# Patient Record
Sex: FEMALE | Race: WHITE
[De-identification: names, ages, dates, MRNs, and addresses within clinical notes are randomized per-mention and may not be internally consistent; named-entity substitution may affect disease eponyms.]

---

## 2018-06-14 ENCOUNTER — HOSPITAL ENCOUNTER (OUTPATIENT)
Dept: HOSPITAL 62 - END | Age: 78
Discharge: HOME | End: 2018-06-14
Attending: INTERNAL MEDICINE
Payer: MEDICARE

## 2018-06-14 VITALS — DIASTOLIC BLOOD PRESSURE: 67 MMHG | SYSTOLIC BLOOD PRESSURE: 126 MMHG

## 2018-06-14 DIAGNOSIS — K21.0: Primary | ICD-10-CM

## 2018-06-14 DIAGNOSIS — I10: ICD-10-CM

## 2018-06-14 DIAGNOSIS — Z79.899: ICD-10-CM

## 2018-06-14 DIAGNOSIS — K29.50: ICD-10-CM

## 2018-06-14 DIAGNOSIS — Z88.5: ICD-10-CM

## 2018-06-14 PROCEDURE — 88305 TISSUE EXAM BY PATHOLOGIST: CPT

## 2018-06-14 PROCEDURE — 43239 EGD BIOPSY SINGLE/MULTIPLE: CPT

## 2018-06-14 RX ADMIN — FENTANYL CITRATE ONE MCG: 50 INJECTION INTRAMUSCULAR; INTRAVENOUS at 13:11

## 2018-06-14 RX ADMIN — FENTANYL CITRATE ONE MCG: 50 INJECTION INTRAMUSCULAR; INTRAVENOUS at 13:15

## 2018-06-14 NOTE — OPERATIVE REPORT
Operative Report


DATE OF SURGERY: 06/14/18


Operative Report: 





The risks benefits and alternatives of the procedure explained to the patient 

in detail and informed consent is obtained.A GIF Olympus video scope was 

inserted into the patient's mouth and hypopharynx, the esophagus is identified 

intubated and insufflated, the scope was then advanced through the esophagus 

stomach and duodenum, retroflexion maneuver is done ,the esophagus stomach and 

first and second portions of the duodenum examined


PREOPERATIVE DIAGNOSIS: Epigastric pain, gastroesophageal reflux disease


POSTOPERATIVE DIAGNOSIS: Mid esophageal lesion that is more consistent with 

Beyer's status post biopsy.  Gastritis status post biopsy rule out 

Helicobacter pylori


OPERATION: EGD with biopsy


SURGEON: GUZMAN EGAN


ANESTHESIA: Moderate Sedation - 2 mg of Versed, 75 mcg of fentanyl.  Conscious 

sedation monitoring time 30 minutes.


TISSUE REMOVED OR ALTERED: As noted above.


COMPLICATIONS: 





None.


ESTIMATED BLOOD LOSS: None.


INTRAOPERATIVE FINDINGS: As noted above.


PROCEDURE: 





Patient tolerated the procedure well.


No immediate postprocedure complications are noted.


Patient discharged in good condition.


Discharge date 6/14/2018.


Discharge diet: Regular.


Discharge activity: Regular.


2-3 week follow-up to discuss findings.


Patient is instructed call the office or proceed to the emergency room should 

there be any further problems or questions.


We will be on the pathology.


If biopsies are positive may need ablation but will speak to the patient before 

that.

## 2018-06-26 ENCOUNTER — HOSPITAL ENCOUNTER (OUTPATIENT)
Dept: HOSPITAL 62 - END | Age: 78
Discharge: HOME | End: 2018-06-26
Attending: INTERNAL MEDICINE
Payer: MEDICARE

## 2018-06-26 VITALS — DIASTOLIC BLOOD PRESSURE: 68 MMHG | SYSTOLIC BLOOD PRESSURE: 117 MMHG

## 2018-06-26 DIAGNOSIS — I10: ICD-10-CM

## 2018-06-26 DIAGNOSIS — K21.9: ICD-10-CM

## 2018-06-26 DIAGNOSIS — Z88.5: ICD-10-CM

## 2018-06-26 DIAGNOSIS — R00.1: ICD-10-CM

## 2018-06-26 DIAGNOSIS — F41.9: ICD-10-CM

## 2018-06-26 DIAGNOSIS — Z79.82: ICD-10-CM

## 2018-06-26 DIAGNOSIS — Z79.899: ICD-10-CM

## 2018-06-26 DIAGNOSIS — Z13.89: ICD-10-CM

## 2018-06-26 DIAGNOSIS — K22.719: Primary | ICD-10-CM

## 2018-06-26 PROCEDURE — 43270 EGD LESION ABLATION: CPT

## 2018-06-26 RX ADMIN — FENTANYL CITRATE ONE MCG: 50 INJECTION INTRAMUSCULAR; INTRAVENOUS at 12:48

## 2018-06-26 RX ADMIN — MIDAZOLAM HYDROCHLORIDE ONE MG: 1 INJECTION, SOLUTION INTRAMUSCULAR; INTRAVENOUS at 12:46

## 2018-06-26 RX ADMIN — MIDAZOLAM HYDROCHLORIDE ONE MG: 1 INJECTION, SOLUTION INTRAMUSCULAR; INTRAVENOUS at 12:42

## 2018-06-26 RX ADMIN — FENTANYL CITRATE ONE MCG: 50 INJECTION INTRAMUSCULAR; INTRAVENOUS at 12:44

## 2018-06-26 NOTE — OPERATIVE REPORT
Operative Report


DATE OF SURGERY: 06/26/18


Operative Report: 





The risks benefits and alternatives of the procedure explained to the patient 

in detail and informed consent is obtained.A GIF Olympus video scope was 

inserted into the patient's mouth and hypopharynx, the esophagus is identified 

intubated and insufflated, the scope was then advanced through the esophagus 

stomach and duodenum, retroflexion maneuver is done ,the esophagus stomach and 

first and second portions of the duodenum examined


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


POSTOPERATIVE DIAGNOSIS: Beyer's esophagus status post ablation


OPERATION: EGD with ablation


SURGEON: GUZMAN EGAN


ANESTHESIA: Moderate Sedation - 4 mg of Versed, 75 mcg of fentanyl.  Conscious 

sedation monitoring time 30 minutes.


TISSUE REMOVED OR ALTERED: None.


COMPLICATIONS: 





None.


ESTIMATED BLOOD LOSS: None.


INTRAOPERATIVE FINDINGS: As noted above.


PROCEDURE: 





Patient tolerated the procedure well.


No immediate postprocedure complications are noted.


Patient discharged in good condition.


Discharge date 6/26/2018.


Discharge diet: Regular.


Discharge activity: Regular.


2-3 week follow-up to discuss findings.


Patient is instructed to call the office or proceed to the emergency room 

should there be any further problems or questions.

## 2019-08-28 ENCOUNTER — HOSPITAL ENCOUNTER (OUTPATIENT)
Dept: HOSPITAL 62 - END | Age: 79
Discharge: HOME | End: 2019-08-28
Attending: INTERNAL MEDICINE
Payer: MEDICARE

## 2019-08-28 VITALS — DIASTOLIC BLOOD PRESSURE: 60 MMHG | SYSTOLIC BLOOD PRESSURE: 116 MMHG

## 2019-08-28 DIAGNOSIS — I10: ICD-10-CM

## 2019-08-28 DIAGNOSIS — Z79.82: ICD-10-CM

## 2019-08-28 DIAGNOSIS — K21.9: Primary | ICD-10-CM

## 2019-08-28 DIAGNOSIS — K29.50: ICD-10-CM

## 2019-08-28 DIAGNOSIS — R00.1: ICD-10-CM

## 2019-08-28 DIAGNOSIS — K22.70: ICD-10-CM

## 2019-08-28 DIAGNOSIS — K44.9: ICD-10-CM

## 2019-08-28 PROCEDURE — 43270 EGD LESION ABLATION: CPT

## 2019-08-28 PROCEDURE — 43239 EGD BIOPSY SINGLE/MULTIPLE: CPT

## 2019-08-28 PROCEDURE — 88305 TISSUE EXAM BY PATHOLOGIST: CPT

## 2019-08-28 RX ADMIN — FENTANYL CITRATE ONE MCG: 50 INJECTION INTRAMUSCULAR; INTRAVENOUS at 08:28

## 2019-08-28 RX ADMIN — MIDAZOLAM HYDROCHLORIDE ONE MG: 1 INJECTION, SOLUTION INTRAMUSCULAR; INTRAVENOUS at 08:30

## 2019-08-28 RX ADMIN — FENTANYL CITRATE ONE MCG: 50 INJECTION INTRAMUSCULAR; INTRAVENOUS at 08:27

## 2019-08-28 RX ADMIN — MIDAZOLAM HYDROCHLORIDE ONE MG: 1 INJECTION, SOLUTION INTRAMUSCULAR; INTRAVENOUS at 08:25

## 2019-08-28 RX ADMIN — MIDAZOLAM HYDROCHLORIDE ONE MG: 1 INJECTION, SOLUTION INTRAMUSCULAR; INTRAVENOUS at 08:29

## 2019-08-28 NOTE — OPERATIVE REPORT
Operative Report


DATE OF SURGERY: 08/28/19


Operative Report: 





The risks benefits and alternatives of the procedure explained to the patient in

detail and informed consent is obtained.A GIF Olympus video scope was inserted 

into the patient's mouth and hypopharynx, the esophagus is identified intubated 

and insufflated, the scope was then advanced through the esophagus stomach and 

duodenum, retroflexion maneuver is done, the esophagus stomach and first and 

second portions of the duodenum examined.


PREOPERATIVE DIAGNOSIS: History of Beyer's esophagus, gastroesophageal reflux 

disease


POSTOPERATIVE DIAGNOSIS: Beyer's esophagus status post radiofrequency 

ablation.  Hiatal hernia.  Gastritis status post biopsy without Helicobacter 

pylori


OPERATION: EGD with biopsy.  EGD with radiofrequency ablation


SURGEON: GUZMAN EGAN


ANESTHESIA: Moderate Sedation - 4 mg of Versed, 50 mcg of fentanyl.  Conscious 

sedation monitoring time 30 minutes.


TISSUE REMOVED OR ALTERED: As noted above.


COMPLICATIONS: 





None.


ESTIMATED BLOOD LOSS: None.


INTRAOPERATIVE FINDINGS: As noted above.


PROCEDURE: 





Patient tolerated the procedure well.


No immediate postprocedure complications are noted.


Patient is discharged in good condition.


Discharge date 8/28/2019.


Discharge diet: Regular.


Discharge activity: Regular.


2 to 3-week follow-up to discuss findings.


Patient is instructed to call the office or proceed to the emergency room should

there be any further problems or questions.


Wait on the pathology.

## 2020-03-29 ENCOUNTER — HOSPITAL ENCOUNTER (EMERGENCY)
Dept: HOSPITAL 62 - ER | Age: 80
Discharge: HOME | End: 2020-03-29
Payer: MEDICARE

## 2020-03-29 VITALS — DIASTOLIC BLOOD PRESSURE: 66 MMHG | SYSTOLIC BLOOD PRESSURE: 124 MMHG

## 2020-03-29 DIAGNOSIS — R61: ICD-10-CM

## 2020-03-29 DIAGNOSIS — I10: ICD-10-CM

## 2020-03-29 DIAGNOSIS — K59.01: Primary | ICD-10-CM

## 2020-03-29 DIAGNOSIS — R11.10: ICD-10-CM

## 2020-03-29 DIAGNOSIS — R00.1: ICD-10-CM

## 2020-03-29 DIAGNOSIS — K21.9: ICD-10-CM

## 2020-03-29 DIAGNOSIS — R10.11: ICD-10-CM

## 2020-03-29 DIAGNOSIS — Z79.899: ICD-10-CM

## 2020-03-29 LAB
ADD MANUAL DIFF: NO
ALBUMIN SERPL-MCNC: 4.1 G/DL (ref 3.5–5)
ALP SERPL-CCNC: 64 U/L (ref 38–126)
ANION GAP SERPL CALC-SCNC: 6 MMOL/L (ref 5–19)
APPEARANCE UR: CLEAR
APTT PPP: YELLOW S
AST SERPL-CCNC: 35 U/L (ref 14–36)
BASOPHILS # BLD AUTO: 0 10^3/UL (ref 0–0.2)
BASOPHILS NFR BLD AUTO: 0.5 % (ref 0–2)
BILIRUB DIRECT SERPL-MCNC: 0 MG/DL (ref 0–0.4)
BILIRUB SERPL-MCNC: 1.3 MG/DL (ref 0.2–1.3)
BILIRUB UR QL STRIP: NEGATIVE
BUN SERPL-MCNC: 18 MG/DL (ref 7–20)
CALCIUM: 9 MG/DL (ref 8.4–10.2)
CHLORIDE SERPL-SCNC: 102 MMOL/L (ref 98–107)
CO2 SERPL-SCNC: 31 MMOL/L (ref 22–30)
EOSINOPHIL # BLD AUTO: 0 10^3/UL (ref 0–0.6)
EOSINOPHIL NFR BLD AUTO: 0.5 % (ref 0–6)
ERYTHROCYTE [DISTWIDTH] IN BLOOD BY AUTOMATED COUNT: 13.1 % (ref 11.5–14)
GLUCOSE SERPL-MCNC: 99 MG/DL (ref 75–110)
GLUCOSE UR STRIP-MCNC: NEGATIVE MG/DL
HCT VFR BLD CALC: 42 % (ref 36–47)
HGB BLD-MCNC: 14.8 G/DL (ref 12–15.5)
KETONES UR STRIP-MCNC: NEGATIVE MG/DL
LYMPHOCYTES # BLD AUTO: 0.6 10^3/UL (ref 0.5–4.7)
LYMPHOCYTES NFR BLD AUTO: 7.2 % (ref 13–45)
MCH RBC QN AUTO: 30.6 PG (ref 27–33.4)
MCHC RBC AUTO-ENTMCNC: 35.1 G/DL (ref 32–36)
MCV RBC AUTO: 87 FL (ref 80–97)
MONOCYTES # BLD AUTO: 0.6 10^3/UL (ref 0.1–1.4)
MONOCYTES NFR BLD AUTO: 7 % (ref 3–13)
NEUTROPHILS # BLD AUTO: 6.9 10^3/UL (ref 1.7–8.2)
NEUTS SEG NFR BLD AUTO: 84.8 % (ref 42–78)
NITRITE UR QL STRIP: NEGATIVE
PH UR STRIP: 7 [PH] (ref 5–9)
PLATELET # BLD: 178 10^3/UL (ref 150–450)
POTASSIUM SERPL-SCNC: 4.6 MMOL/L (ref 3.6–5)
PROT SERPL-MCNC: 6.9 G/DL (ref 6.3–8.2)
PROT UR STRIP-MCNC: NEGATIVE MG/DL
RBC # BLD AUTO: 4.82 10^6/UL (ref 3.72–5.28)
SP GR UR STRIP: 1.01
TOTAL CELLS COUNTED % (AUTO): 100 %
UROBILINOGEN UR-MCNC: NEGATIVE MG/DL (ref ?–2)
WBC # BLD AUTO: 8.1 10^3/UL (ref 4–10.5)

## 2020-03-29 PROCEDURE — 36415 COLL VENOUS BLD VENIPUNCTURE: CPT

## 2020-03-29 PROCEDURE — 99284 EMERGENCY DEPT VISIT MOD MDM: CPT

## 2020-03-29 PROCEDURE — 93005 ELECTROCARDIOGRAM TRACING: CPT

## 2020-03-29 PROCEDURE — 93010 ELECTROCARDIOGRAM REPORT: CPT

## 2020-03-29 PROCEDURE — 83690 ASSAY OF LIPASE: CPT

## 2020-03-29 PROCEDURE — 85025 COMPLETE CBC W/AUTO DIFF WBC: CPT

## 2020-03-29 PROCEDURE — 80053 COMPREHEN METABOLIC PANEL: CPT

## 2020-03-29 PROCEDURE — 84484 ASSAY OF TROPONIN QUANT: CPT

## 2020-03-29 PROCEDURE — 81001 URINALYSIS AUTO W/SCOPE: CPT

## 2020-03-29 PROCEDURE — 83605 ASSAY OF LACTIC ACID: CPT

## 2020-03-29 PROCEDURE — 74018 RADEX ABDOMEN 1 VIEW: CPT

## 2020-03-29 NOTE — RADIOLOGY REPORT (SQ)
EXAM:

  XR Abdomen, 1 View



EXAM DATE/TIME:

  03/29/2020 7:11 AM



CLINICAL HISTORY:

  The patient is 79 years old and is Female; abdominal pain ruq



TECHNIQUE:

  Frontal supine view of the abdomen/pelvis.



COMPARISON:

  No relevant prior studies available.



FINDINGS:

  GASTROINTESTINAL TRACT:  Moderate amount of stool in the colon.

No obvious bowel obstruction.

  BONES/JOINTS:  Degenerative changes of the lower lumbar spine.

  SOFT TISSUES:  Unremarkable as visualized.



IMPRESSION:     

  No acute findings.

## 2020-03-29 NOTE — ER DOCUMENT REPORT
Entered by JOAQUIN TROY SCRIBE  20 0630 





Acting as scribe for:FELICIA RAMOS MD





ED General





- General


Chief Complaint: Abdominal Pain


Stated Complaint: ABDOMINAL PAIN


Time Seen by Provider: 20 06:13


Primary Care Provider: 


LOUIE BRIZUELA MD [Primary Care Provider] - Follow up as needed


Information source: Patient


Notes: 





This 79-year-old female presents to the emergency department via EMS complaining

of RUQ abdominal pain that began about 4 hours ago. Patient describes that when 

she went to sleep, she felt normal. Patient was awoken at 2AM stating that she 

"felt gas in her stomach". Patient said that this is a normal symptom for her 

but after awhile a pain began in her RUQ that she described as dull and aching. 

Patient said that she had a bowel movement with no relief of symptoms. Patient 

stated that the pain was worsening with associated diaphoresis and a feeling of 

passing out. Patient stated that she got nervous and when she pressed on her RUQ

it felt sore. Patient called EMS and said that when she was picked up by EMS, 

she vomited once. Patient has had no sick contact. Patient states that she has 

never felt this pain before but she feels better now. Patient denies mouth 

swelling, trouble swallowing, chest pain and wheezing.








TRAVEL OUTSIDE OF THE [place] IN LAST 30 DAYS: No





- Related Data


Allergies/Adverse Reactions: 


                                        





codeine Adverse Reaction (Verified 19 08:03)


   shakey, nervous


diphenhydramine [From Benadryl] Adverse Reaction (Verified 19 08:03)


   Hyperactive, shakey, nervous


oxycodone [From Percocet] Adverse Reaction (Verified 19 08:03)


   hallucination








Home Medications: Amlodipine, Vitamin C, Omeprazole, Estratest





Past Medical History





- General


Information source: Patient





- Social History


Smoking Status: Former Smoker


Cigarette use (# per day): No


Chew tobacco use (# tins/day): No


Lives with: Parents


Family History: None


Patient has suicidal ideation: No


Patient has homicidal ideation: No





- Past Medical History


Cardiac Medical History: Reports: Hx Hypertension


GI Medical History: Reports: Hx Gastroesophageal Reflux Disease, Hx Hiatal 

Hernia


Musculoskeletal Medical History: Reports Hx Arthritis


Psychiatric Medical History: Reports: Hx Anxiety


Past Surgical History: Reports: Hx  Section, Hx Hysterectomy





- Immunizations


Hx Diphtheria, Pertussis, Tetanus Vaccination: Yes


Hx Pneumococcal Vaccination: 17





Review of Systems





- Review of Systems


Constitutional: See HPI, Diaphoresis


EENT: See HPI.  denies: Difficulty swallowing, Mouth swelling


Cardiovascular: See HPI.  denies: Chest pain


Respiratory: See HPI.  denies: Wheezing


Gastrointestinal: See HPI, Abdominal pain, Vomiting, Last bowel movement


Genitourinary: No symptoms reported


Female Genitourinary: No symptoms reported


Musculoskeletal: No symptoms reported


Skin: No symptoms reported


Hematologic/Lymphatic: No symptoms reported


Neurological/Psychological: No symptoms reported


-: Yes All other systems reviewed and negative





Physical Exam





- Vital signs


Vitals: 


                                        











Temp Pulse Resp BP Pulse Ox


 


 97.4 F   64   14   128/62 H  94 


 


 20 05:50  20 05:50  20 05:50  20 05:50  20 05:50














- Notes


Notes: 





Physical Exam:


 


General: Alert, appears well. 


 


HEENT: Normocephalic. Atraumatic. PERRL. Extraocular movements intact. 

Oropharynx clear. TMs are clear and non-bulging, bilaterally.


 


Neck: Supple. Non-tender.


 


Respiratory: No respiratory distress. Clear and equal breath sounds bilaterally.


 


Cardiovascular: Regular rate and rhythm. 


 


Abdominal: Non-tender. No distension. Hyperactive Bowel Sounds. 


 


Back: No gross abnormalities. 


 


Extremities: Moves all four extremities.


Upper extremities: Normal inspection. Normal ROM.  


Lower extremities: Normal inspection. No edema. Normal ROM.


 


Neurological: Normal cognition. AAOx4. Normal speech.  


 


Psychological: Normal affect. Normal Mood. 


 


Skin: Warm. Dry. Normal color.








Course





- Re-evaluation


Re-evalutation: 





20 09:30


Patient resting comfortably.  Patient reports again that she has not had any 

pain since she has been in the emergency department.  Her pain improved once EMS

arrived to her residence and have given her Zofran and Benadryl prior to arrival

to the ED.





- Vital Signs


Vital signs: 


                                        











Temp Pulse Resp BP Pulse Ox


 


 97.4 F   64   14   128/62 H  94 


 


 20 05:50  20 05:50  20 05:50  20 05:50  20 05:50














- Laboratory


Result Diagrams: 


                                 20 07:25





                                 20 07:25


Laboratory results interpreted by me: 


                                        











  20





  07:25 07:25


 


Lymph % (Auto)  7.2 L 


 


Seg Neutrophils %  84.8 H 


 


Carbon Dioxide   31 H








Patient has a carbon dioxide of 31 and 84% segs.  With a normal white count.  

Patient does not appear to have any infectious disease going on at this time.  

And patient has been given a bolus of 250 milliliters of normal saline.





- Diagnostic Test


Radiology reviewed: Image reviewed, Reports reviewed


Radiology results interpreted by me: 





20 07:50


Abdominal KUB single view radiology report shows moderate amount of stool no 

obstruction noted.  Degenerative changes in the lumbar spine otherwise negative.





- EKG Interpretation by Me


Additional EKG results interpreted by me: 





20 06:52


Twelve-lead EKG done 3/29/2020 at 648 shows sinus bradycardia rate of 59 left 

axis deviation.  No other acute ST-T wave changes.





Discharge





- Discharge


Clinical Impression: 


 Constipation by delayed colonic transit





Condition: Stable


Disposition: HOME, SELF-CARE


Instructions:  Abdominal Pain ([facility])


Additional Instructions: 


Constipation





     Constipation is a common problem.  It is especially likely as you get 

older.  Constipation is a common cause of abdominal pain, but sometimes causes 

no symptoms at all.  Causes of constipation include certain medications, 

dehydration, diets, inactivity, and low-fiber intake.  Rarely, it can be a 

symptom of underlying disease.  The physician has evaluated you for this.


     Avoid constipation by eating a diet high in fiber, fruits, and vegetables. 

Drink plenty of liquids.  Get regular exercise.  If possible, avoid constipating

medicines like narcotic pain medication.  Some vitamin tablets can cause 

constipation.  Stool softeners may be needed for difficult cases.  An excellent 

stool softener is Konsyl which is available at [employer], and [employer].  Just add a teaspoon to a glass of pineapple or orange juice daily or

twice a day if needed.


   Laxatives are useful for occasional constipation.  You should use them only 

when necessary.  Too-frequent use can make your bowels dependent on them.  Some 

over the counter laxatives available without prescription are:





   Milk of Magnesia, 1-2 tablespoons twice a day


    Dulcolax, 5 mg pill or 10 mg suppository.


   Citrate of Magnesia, 4-5 ounces a day for a day or two





For acute constipation, Fleet's Enemas and Dulcolax suppositories are helpful.


     Chronic, long term  use of laxatives or enemas is not a good idea.  Your 

bowel may become dependant on them.  You do not need to have a bowel movement 

every day.  Many people do fine with a bowel movement every three or four days.


     You should call your doctor or return for re-evaluation if you pass blood 

in the stool, or if you develop fever or increasing abdominal pain.


Prescriptions: 


Magnesium Citrate [Citrate of Magnesia 296 ml Bottle] 296 ml PO DAILY #1 bottle


Referrals: 


LOUIE BRIZUELA MD [Primary Care Provider] - Follow up as needed





I personally performed the services described in the documentation, reviewed and

edited the documentation which was dictated to the scribe in my presence, and it

accurately records my words and actions.

## 2020-03-29 NOTE — EKG REPORT
SEVERITY:- ABNORMAL ECG -

SINUS RHYTHM

LEFT AXIS DEVIATION

NONSPECIFIC ST-T CHANGES  LATERAL LEADS

:

Confirmed by: Blade Garcia MD 29-Mar-2020 08:53:51

## 2020-10-28 ENCOUNTER — HOSPITAL ENCOUNTER (OUTPATIENT)
Dept: HOSPITAL 62 - END | Age: 80
Discharge: HOME | End: 2020-10-28
Attending: INTERNAL MEDICINE
Payer: MEDICARE

## 2020-10-28 VITALS — SYSTOLIC BLOOD PRESSURE: 142 MMHG | DIASTOLIC BLOOD PRESSURE: 73 MMHG

## 2020-10-28 DIAGNOSIS — Z03.818: ICD-10-CM

## 2020-10-28 DIAGNOSIS — Z88.8: ICD-10-CM

## 2020-10-28 DIAGNOSIS — R63.4: ICD-10-CM

## 2020-10-28 DIAGNOSIS — Z87.891: ICD-10-CM

## 2020-10-28 DIAGNOSIS — R51.9: ICD-10-CM

## 2020-10-28 DIAGNOSIS — K57.30: Primary | ICD-10-CM

## 2020-10-28 DIAGNOSIS — R19.4: ICD-10-CM

## 2020-10-28 DIAGNOSIS — Z88.6: ICD-10-CM

## 2020-10-28 DIAGNOSIS — D64.9: ICD-10-CM

## 2020-10-28 DIAGNOSIS — K21.9: ICD-10-CM

## 2020-10-28 DIAGNOSIS — K64.8: ICD-10-CM

## 2020-10-28 DIAGNOSIS — K52.9: ICD-10-CM

## 2020-10-28 DIAGNOSIS — M48.00: ICD-10-CM

## 2020-10-28 DIAGNOSIS — I10: ICD-10-CM

## 2020-10-28 DIAGNOSIS — Z80.0: ICD-10-CM

## 2020-10-28 DIAGNOSIS — K63.89: ICD-10-CM

## 2020-10-28 DIAGNOSIS — Z79.899: ICD-10-CM

## 2020-10-28 PROCEDURE — 87635 SARS-COV-2 COVID-19 AMP PRB: CPT

## 2020-10-28 PROCEDURE — 45380 COLONOSCOPY AND BIOPSY: CPT

## 2020-10-28 PROCEDURE — C9803 HOPD COVID-19 SPEC COLLECT: HCPCS

## 2020-10-28 PROCEDURE — 88305 TISSUE EXAM BY PATHOLOGIST: CPT

## 2020-10-28 NOTE — OPERATIVE REPORT
Operative Report


DATE OF SURGERY: 10/28/20


Operative Report: 





The risk, benefits and alternatives of the procedure including the risks of 

bleeding, perforation requiring surgery have been explained to the patient in 

detail and informed consent has been obtained.  Patient is placed in left 

lateral decubital position.  Timeout was called.  Propofol medication is 

administered.  Rectal examination is done which did not reveal any masses, tears

or fissures.  An Olympus videoscope was introduced into the patient's rectum.  

Scope was then carefully advanced all the way to the cecum.  Cecum was 

identified by the usual anatomical landmarks including the ileocecal valve as 

well as the appendiceal office.  Photodocumentation is obtained.  Scope was then

sequentially pulled back via the various segments of the colon including the 

ascending colon, hepatic flexure, transverse colon, splenic flexure, descending 

colon finding to the rectosigmoid portions of the colon.  Retroflexion maneuver 

is performed.


PREOPERATIVE DIAGNOSIS: Change of bowel habits.  Family history of colorectal 

cancer


POSTOPERATIVE DIAGNOSIS: Moderate sigmoid diverticulosis.  Internal hemorrhoids.

 Mild inflammation noted on the right-hand side of the colon status post biopsy


OPERATION: Colonoscopy with biopsy


SURGEON: GUZMAN EGAN


ANESTHESIA: LMAC


TISSUE REMOVED OR ALTERED: As noted above.


COMPLICATIONS: 





None.


ESTIMATED BLOOD LOSS: None.


INTRAOPERATIVE FINDINGS: As noted above.


PROCEDURE: 





Patient tolerated the procedure well.


No immediate postprocedure complications are noted.


Patient is discharged in good condition.


Discharge date 10/28/2020.


Discharge diet: Regular.


Discharge activity: Regular.


2 to 3-week follow-up to discuss findings.


5-year surveillance colonoscopy due to the family history of colorectal cancer


Wait on the pathology.


No evidence of diverticulitis.